# Patient Record
Sex: FEMALE | Race: ASIAN | Employment: PART TIME
[De-identification: names, ages, dates, MRNs, and addresses within clinical notes are randomized per-mention and may not be internally consistent; named-entity substitution may affect disease eponyms.]

---

## 2023-10-31 ENCOUNTER — NURSE TRIAGE (OUTPATIENT)
Dept: OTHER | Facility: CLINIC | Age: 58
End: 2023-10-31

## 2023-10-31 NOTE — TELEPHONE ENCOUNTER
Location of patient: [place]    Received call from [facility] at [facility]; Patient with Red Flag Complaint requesting to establish care with [facility]. Subjective: Caller states \"\"     Current Symptoms: hit in face with garden hoe, swelling to upper lip on right side. Painful. States cut on inside of lip from tooth. About 0.5 cm. Bleeding controlled. States dizzy after being hit and felt she was seeing starts when this happened-resolved, did not lose consciousness. States eye feels funny, denies visual abnormalities or vision changes. Onset: Saturday    Associated Symptoms:     Pain Severity: 8/10; ;     Temperature: denies fever     What has been tried: ice    LMP:  Pregnant:     Recommended disposition: See PCP within 24 Hours. UCC if unable to be seen    Care advice provided, patient verbalizes understanding; denies any other questions or concerns; instructed to call back for any new or worsening symptoms. Patient/Caller agrees with recommended disposition; writer provided warm transfer to Robbin Weaver at [facility] for appointment scheduling    Attention Provider: Thank you for allowing me to participate in the care of your patient. The patient was connected to triage in response to information provided to the [facility]. Please do not respond through this encounter as the response is not directed to a shared pool.     Reason for Disposition   [1] Looks infected (increasing pain, redness or swelling after 48 hrs) AND [2] no fever    Protocols used: Mouth Injury-ADULT-AH